# Patient Record
Sex: MALE | Race: ASIAN | NOT HISPANIC OR LATINO | URBAN - METROPOLITAN AREA
[De-identification: names, ages, dates, MRNs, and addresses within clinical notes are randomized per-mention and may not be internally consistent; named-entity substitution may affect disease eponyms.]

---

## 2021-03-01 ENCOUNTER — COMMUNICATION - HEALTHEAST (OUTPATIENT)
Dept: NURSING | Facility: CLINIC | Age: 31
End: 2021-03-01

## 2021-03-01 ENCOUNTER — AMBULATORY - HEALTHEAST (OUTPATIENT)
Dept: NURSING | Facility: CLINIC | Age: 31
End: 2021-03-01

## 2021-03-01 DIAGNOSIS — U07.1 2019 NOVEL CORONAVIRUS DISEASE (COVID-19): ICD-10-CM

## 2021-06-15 NOTE — PROGRESS NOTES
Clinic Care Coordination Contact   Patient stated that he had no question and did not need anything at this time.

## 2021-06-16 PROBLEM — Z86.39 H/O DIABETES MELLITUS: Status: ACTIVE | Noted: 2017-09-27

## 2021-06-16 PROBLEM — Z86.79 H/O: HYPERTENSION: Status: ACTIVE | Noted: 2017-09-27

## 2021-06-16 PROBLEM — F17.200 SMOKER: Status: ACTIVE | Noted: 2017-03-17

## 2021-06-16 PROBLEM — I10 HYPERTENSION: Status: ACTIVE | Noted: 2021-02-26
